# Patient Record
Sex: FEMALE | Race: WHITE | NOT HISPANIC OR LATINO | URBAN - METROPOLITAN AREA
[De-identification: names, ages, dates, MRNs, and addresses within clinical notes are randomized per-mention and may not be internally consistent; named-entity substitution may affect disease eponyms.]

---

## 2020-03-30 ENCOUNTER — OFFICE (OUTPATIENT)
Dept: URBAN - METROPOLITAN AREA CLINIC 10 | Facility: CLINIC | Age: 50
End: 2020-03-30

## 2020-03-30 VITALS
DIASTOLIC BLOOD PRESSURE: 92 MMHG | HEIGHT: 63 IN | WEIGHT: 134 LBS | TEMPERATURE: 97 F | SYSTOLIC BLOOD PRESSURE: 158 MMHG | HEART RATE: 77 BPM

## 2020-03-30 DIAGNOSIS — R10.84 GENERALIZED ABDOMINAL PAIN: ICD-10-CM

## 2020-03-30 DIAGNOSIS — K59.00 CONSTIPATION, UNSPECIFIED: ICD-10-CM

## 2020-03-30 DIAGNOSIS — R14.0 ABDOMINAL DISTENSION (GASEOUS): ICD-10-CM

## 2020-03-30 PROCEDURE — 99243 OFF/OP CNSLTJ NEW/EST LOW 30: CPT | Performed by: PHYSICIAN ASSISTANT

## 2020-03-30 NOTE — SERVICEHPINOTES
Ms. Arguello is 49 years old. She is here in consultation with Dr. Guerra for chief complaint of constipation. She notes a long hx of constipation but has recently worsened over the last 4 weeks. She was having BM about every 4 days sometimes on her own, if not she would use ex lax. Recently, she has used multiple enemas and OTC stool softeners, Miralax, as well as Ex-lax without any change. She has had some small amount of liquid stools in the last few week using Linzess and lactulose. She is still having severe abdominal cramping and discomfort which is better after a good BM. She mentions a recent episode while running on the treadmill where she says "it felt like something was moving down a staircase in 3 stages" which she thought was precursor to a BM, but nothing happened. She notes a normal appetite and eating normally. She says this is the longest period of time she has gone without any solid stool BMs. Recent XR of the abdomen suggested constipation but no obstruction per pt. She also notes she had CT which was negative. Both performed at [facility]. Denies any blood in stools. Denies any abnormal weight loss, n/v, or f/c. Imaging Records from [facility]: XR abdomen-large amount of stool noted, no other abrnormality. CT A/P negative. Review of records from Dr. Guerra show she was given samples of Linzess 145 and [id number]/2009-EGD: Duodenum normal with normal duodenal biopsies. Antral erosions with biopsies demonstrating reactive gastropathy negative for H pylori. Esophagus normal with normal random biopsies other than mild reflux changes.BR-colonoscopy: Diminutive rectal hyperplastic polyp. Normal colon mucosa with negative randodm biopsies.

## 2020-05-07 ENCOUNTER — OFFICE (OUTPATIENT)
Dept: URBAN - METROPOLITAN AREA TELEHEALTH 11 | Facility: TELEHEALTH | Age: 50
End: 2020-05-07
Payer: COMMERCIAL

## 2020-05-07 VITALS — HEIGHT: 63 IN

## 2020-05-07 DIAGNOSIS — R14.0 ABDOMINAL DISTENSION (GASEOUS): ICD-10-CM

## 2020-05-07 DIAGNOSIS — K59.00 CONSTIPATION, UNSPECIFIED: ICD-10-CM

## 2020-05-07 RX ORDER — PLECANATIDE 3 MG/1
TABLET ORAL
Qty: 90 | Refills: 3 | Status: COMPLETED
Start: 2020-05-07 | End: 2018-11-01

## 2020-05-07 RX ORDER — PRUCALOPRIDE 1 MG/1
TABLET, FILM COATED ORAL
Qty: 90 | Refills: 3 | Status: COMPLETED
Start: 2020-05-07 | End: 2020-05-07

## 2020-05-07 RX ORDER — SODIUM SULFATE, POTASSIUM SULFATE, MAGNESIUM SULFATE 17.5; 3.13; 1.6 G/ML; G/ML; G/ML
SOLUTION, CONCENTRATE ORAL
Qty: 1 | Refills: 0 | Status: COMPLETED
Start: 2020-05-07 | End: 2021-05-03

## 2020-05-07 NOTE — SERVICEHPINOTES
Ms. Arguello is 49 years old. She is here for follow up of constipation. She states motegrity works but works too well and has been cutting tablets in half. She still has bloating that is post-prandial and progress as the day goes on. She otherwise denies warning symptoms. She is also taking miralax with her smoothie and is going to the restroom regularly.She has used ex laxm enemas and OTC stool softeners and Miralax. 03/30/2020-patient was given a bowel prep sample and would take any samplesImaging [facility]: XR abdomen-large amount of stool noted, no other abrnormality. CT A/P negative. Review of records from Dr. Guerra show she was given samples of Linzess 145 and [id number]/2009-EGD: Duodenum normal with normal duodenal biopsies. Antral erosions with biopsies demonstrating reactive gastropathy negative for H pylori. Esophagus normal with normal random biopsies other than mild reflux changes.BR-colonoscopy: Diminutive rectal hyperplastic polyp. Normal colon mucosa with negative randodm biopsies.

## 2021-10-14 ENCOUNTER — OFFICE (OUTPATIENT)
Dept: URBAN - METROPOLITAN AREA CLINIC 10 | Facility: CLINIC | Age: 51
End: 2021-10-14

## 2021-10-14 VITALS
HEART RATE: 75 BPM | SYSTOLIC BLOOD PRESSURE: 157 MMHG | WEIGHT: 143 LBS | OXYGEN SATURATION: 99 % | DIASTOLIC BLOOD PRESSURE: 89 MMHG | HEIGHT: 63 IN

## 2021-10-14 DIAGNOSIS — Z12.11 ENCOUNTER FOR SCREENING FOR MALIGNANT NEOPLASM OF COLON: ICD-10-CM

## 2021-10-14 DIAGNOSIS — K59.00 CONSTIPATION, UNSPECIFIED: ICD-10-CM

## 2021-10-14 DIAGNOSIS — R14.0 ABDOMINAL DISTENSION (GASEOUS): ICD-10-CM

## 2021-10-14 PROCEDURE — 999999 NO CHARGE VISIT: Performed by: PHYSICIAN ASSISTANT

## 2021-10-14 RX ORDER — SODIUM SULFATE, POTASSIUM SULFATE, MAGNESIUM SULFATE 17.5; 3.13; 1.6 G/ML; G/ML; G/ML
SOLUTION, CONCENTRATE ORAL
Qty: 354 | Refills: 0 | Status: COMPLETED
Start: 2021-10-14 | End: 2022-12-08

## 2022-12-08 ENCOUNTER — OFFICE (OUTPATIENT)
Dept: URBAN - METROPOLITAN AREA TELEHEALTH 11 | Facility: TELEHEALTH | Age: 52
End: 2022-12-08

## 2022-12-08 VITALS — HEIGHT: 63 IN

## 2022-12-08 DIAGNOSIS — K59.00 CONSTIPATION, UNSPECIFIED: ICD-10-CM

## 2022-12-08 DIAGNOSIS — K92.2 GASTROINTESTINAL HEMORRHAGE, UNSPECIFIED: ICD-10-CM

## 2022-12-08 DIAGNOSIS — K92.1 MELENA: ICD-10-CM

## 2022-12-08 RX ORDER — POLYETHYLENE GLYCOL 3350, SODIUM SULFATE, SODIUM CHLORIDE, POTASSIUM CHLORIDE, ASCORBIC ACID, SODIUM ASCORBATE 140-9-5.2G
KIT ORAL
Qty: 1 | Refills: 0 | Status: COMPLETED
Start: 2022-12-08 | End: 2023-02-22

## 2022-12-08 NOTE — SERVICEHPINOTES
is 52 years old. She complains of 4 episodes of painless hematochezia over the past week. She states her bowel frequency has increased above her baseline. She is not taking any laxatives as she states she does not feel bad. She does complain of some bilateral lower abdominal cramping in the morning and some postprandial bloating but otherwise denies weight loss. She states she had a negative cologuard over the summer.Motegrity 1mg worked for her but was expensive. She has used ex laxm enemas and OTC stool softeners and Miralax.03/30/2020-patient was given a bowel prep sample and would take any samplesImaging [facility] abdomen-large amount of stool noted, no other abrnormality. CT A/P negative.Review of records from Dr. Guerra show she was given samples of Linzess 145 and [id number]/2009-EGD: Duodenum normal with normal duodenal biopsies. Antral erosions with biopsies demonstrating reactive gastropathy negative for H pylori. Esophagus normal with normal random biopsies other than mild reflux changes.br-colonoscopy: Diminutive rectal hyperplastic polyp. Normal colon mucosa with negative randodm biopsies.